# Patient Record
Sex: MALE | Race: WHITE | NOT HISPANIC OR LATINO | Employment: OTHER | URBAN - METROPOLITAN AREA
[De-identification: names, ages, dates, MRNs, and addresses within clinical notes are randomized per-mention and may not be internally consistent; named-entity substitution may affect disease eponyms.]

---

## 2022-01-26 ENCOUNTER — TRANSFERRED RECORDS (OUTPATIENT)
Dept: HEALTH INFORMATION MANAGEMENT | Facility: CLINIC | Age: 67
End: 2022-01-26
Payer: COMMERCIAL

## 2022-02-22 ENCOUNTER — TRANSCRIBE ORDERS (OUTPATIENT)
Dept: OTHER | Age: 67
End: 2022-02-22
Payer: COMMERCIAL

## 2022-02-22 DIAGNOSIS — C44.99 OTHER SPECIFIED MALIGNANT NEOPLASM OF SKIN, UNSPECIFIED: Primary | ICD-10-CM

## 2022-02-25 ENCOUNTER — MEDICAL CORRESPONDENCE (OUTPATIENT)
Dept: HEALTH INFORMATION MANAGEMENT | Facility: CLINIC | Age: 67
End: 2022-02-25
Payer: COMMERCIAL

## 2022-03-03 ENCOUNTER — TRANSCRIBE ORDERS (OUTPATIENT)
Dept: OTHER | Age: 67
End: 2022-03-03
Payer: COMMERCIAL

## 2022-03-03 DIAGNOSIS — D04.9 BASAL CELL CARCINOMA (BCC) IN SITU OF SKIN: Primary | ICD-10-CM

## 2022-03-04 ENCOUNTER — TELEPHONE (OUTPATIENT)
Dept: DERMATOLOGY | Facility: CLINIC | Age: 67
End: 2022-03-04
Payer: COMMERCIAL

## 2022-03-04 NOTE — TELEPHONE ENCOUNTER
[facility] Call Center    Phone Message    May a detailed message be left on voicemail: no     Reason for Call: Appointment Intake    Referring Provider Name:   Dr. Sandhya Escobar  [facility]  Ph: [phone]  Fx: [phone]  [facility] Auth #: [id number]    [facility] Previously seen at [facility].    Diagnosis and/or Symptoms:   BCC, needs MOHS  Other specified malignant neoplasm of skin, unspecified  Basal cell carcinoma (BCC) in situ of skin     Action Taken: Other: [place] [facility]    Travel Screening: Not Applicable       Please call Pt to schedule per referrals.       Oncology Nurse called the patient and they stated they want [place] only with Dr. Arteaga

## 2022-03-04 NOTE — TELEPHONE ENCOUNTER
Left message for pt to call back as we need to know where he had his biopsy done to get the records and he will need to have approval from the [facility] prior to having MOHS procedure. Pt can schedule MOHS but it should be a couple of weeks out to make sure there is time to get [facility] approval.   Callie POWER RN BSN PHN  Specialty Clinics

## 2022-03-07 NOTE — TELEPHONE ENCOUNTER
Spoke to patient who wants a skin check appointment and does not have any concerns at this time.     He will have his records sent to us from the clinic he had an Atypical mole removed.     He will also check with t [facility] to be sure they send us an authorization so he can be seen here as he suspects the current authorization has . I did give him our fax number as well.     Sussy Escobar RN

## 2022-03-07 NOTE — TELEPHONE ENCOUNTER
Patient is currently scheduled for a routine Derm appt 5-17-22.     Per chart review, we cannot access [facility] records in Care everywhere, so if patient needs Mohs surgery, we need records in order to schedule an appointment and if patient wants VA to pay for the visit, need an authorization from the [facility] first and if patient has a current [facility] authorization and that authorization doesn't include Mohs surgery, will need to fill out a request for additional services form to have [facility] authorize Mohs surgery.     Sussy Escobar RN

## 2022-03-07 NOTE — TELEPHONE ENCOUNTER
I don't see that we have any records or VA auth scanned to this patient's chart? Does Derm clinic have paper records?...    I cannot access any records in Care everywhere, either.     See note below- not sure what authorization includes since I cannot see it...    Sussy Escobar RN

## 2022-05-17 ENCOUNTER — OFFICE VISIT (OUTPATIENT)
Dept: DERMATOLOGY | Facility: CLINIC | Age: 67
End: 2022-05-17
Attending: INTERNAL MEDICINE
Payer: COMMERCIAL

## 2022-05-17 VITALS — SYSTOLIC BLOOD PRESSURE: 124 MMHG | DIASTOLIC BLOOD PRESSURE: 85 MMHG | HEART RATE: 67 BPM

## 2022-05-17 DIAGNOSIS — D23.9 DERMAL NEVUS: ICD-10-CM

## 2022-05-17 DIAGNOSIS — C44.99 OTHER SPECIFIED MALIGNANT NEOPLASM OF SKIN, UNSPECIFIED: ICD-10-CM

## 2022-05-17 DIAGNOSIS — L81.4 LENTIGO: Primary | ICD-10-CM

## 2022-05-17 DIAGNOSIS — D18.01 ANGIOMA OF SKIN: ICD-10-CM

## 2022-05-17 DIAGNOSIS — Z85.828 HISTORY OF SKIN CANCER: ICD-10-CM

## 2022-05-17 DIAGNOSIS — L82.1 SEBORRHEIC KERATOSIS: ICD-10-CM

## 2022-05-17 PROCEDURE — 99203 OFFICE O/P NEW LOW 30 MIN: CPT | Performed by: DERMATOLOGY

## 2022-05-17 RX ORDER — ASPIRIN 81 MG/1
81 TABLET, CHEWABLE ORAL
COMMUNITY

## 2022-05-17 RX ORDER — POLYETHYLENE GLYCOL 3350 17 G/17G
1 POWDER, FOR SOLUTION ORAL
COMMUNITY
Start: 2020-12-23

## 2022-05-17 RX ORDER — NITROGLYCERIN 0.4 MG/1
0.4 TABLET SUBLINGUAL
COMMUNITY
Start: 2020-12-03

## 2022-05-17 RX ORDER — ROSUVASTATIN CALCIUM 20 MG/1
10 TABLET, COATED ORAL
COMMUNITY
Start: 2021-01-12

## 2022-05-17 RX ORDER — LISINOPRIL 2.5 MG/1
1 TABLET ORAL DAILY
COMMUNITY
Start: 2020-12-10

## 2022-05-17 RX ORDER — METOPROLOL SUCCINATE 25 MG/1
1 TABLET, EXTENDED RELEASE ORAL DAILY
COMMUNITY
Start: 2020-12-10

## 2022-05-17 RX ORDER — PSYLLIUM HUSK 3.4 G/5.8G
1 POWDER, FOR SUSPENSION ORAL
COMMUNITY

## 2022-05-17 RX ORDER — ACETAMINOPHEN 500 MG
1000 TABLET ORAL
COMMUNITY

## 2022-05-17 RX ORDER — TAMSULOSIN HYDROCHLORIDE 0.4 MG/1
0.8 CAPSULE ORAL
COMMUNITY
Start: 2022-04-22

## 2022-05-17 RX ORDER — HYDROCODONE BITARTRATE AND ACETAMINOPHEN 5; 325 MG/1; MG/1
TABLET ORAL
COMMUNITY
Start: 2020-12-10

## 2022-05-17 RX ORDER — LORATADINE 10 MG/1
10 TABLET ORAL
COMMUNITY

## 2022-05-17 RX ORDER — FOLIC ACID 1 MG/1
1 TABLET ORAL
COMMUNITY

## 2022-05-17 RX ORDER — FLUTICASONE PROPIONATE 50 MCG
SPRAY, SUSPENSION (ML) NASAL
COMMUNITY
Start: 2021-11-14

## 2022-05-17 RX ORDER — GABAPENTIN 400 MG/1
400 CAPSULE ORAL
COMMUNITY
Start: 2020-06-23

## 2022-05-17 RX ORDER — LEVOTHYROXINE SODIUM 175 UG/1
175 TABLET ORAL
COMMUNITY

## 2022-05-17 RX ORDER — PANTOPRAZOLE SODIUM 40 MG/1
40 TABLET, DELAYED RELEASE ORAL
COMMUNITY
Start: 2021-01-12

## 2022-05-17 RX ORDER — CYCLOBENZAPRINE HCL 10 MG
5 TABLET ORAL
COMMUNITY
Start: 2022-03-30

## 2022-05-17 RX ORDER — FUROSEMIDE 20 MG
1 TABLET ORAL DAILY
COMMUNITY
Start: 2021-01-12

## 2022-05-17 RX ORDER — SPIRONOLACTONE 25 MG/1
TABLET ORAL
COMMUNITY
Start: 2020-12-03

## 2022-05-17 NOTE — PROGRESS NOTES
Ulices Rich , a 67 year old year old male patient, I was asked to see by Dr. Escobar for spot on left eyelid.  Patient states this has been present for a while.  Patient reports the following symptoms:  none .  Patient reports the following previous treatments none.  Patient reports the following modifying factors none.  Associated symptoms: none.  Patient has no other skin complaints today.  Remainder of the HPI, Meds, PMH, Allergies, FH, and SH was reviewed in chart.    Pertinent Hx:   Basal cell carcinoma   Past Medical History:   Diagnosis Date     Basal cell carcinoma        History reviewed. No pertinent surgical history.     History reviewed. No pertinent family history.    Social History     Socioeconomic History     Marital status:      Spouse name: Not on file     Number of children: Not on file     Years of education: Not on file     Highest education level: Not on file   Occupational History     Not on file   Tobacco Use     Smoking status: Current Every Day Smoker     Smokeless tobacco: Never Used     Tobacco comment: currently on nicotine gum   Substance and Sexual Activity     Alcohol use: Not on file     Drug use: Not on file     Sexual activity: Not on file   Other Topics Concern     Not on file   Social History Narrative     Not on file     Social Determinants of Health     Financial Resource Strain: Not on file   Food Insecurity: Not on file   Transportation Needs: Not on file   Physical Activity: Not on file   Stress: Not on file   Social Connections: Not on file   Intimate Partner Violence: Not on file   Housing Stability: Not on file       Outpatient Encounter Medications as of 5/17/2022   Medication Sig Dispense Refill     acetaminophen (TYLENOL) 500 MG tablet Take 1,000 mg by mouth       aspirin (ASA) 81 MG chewable tablet Take 81 mg by mouth       cyclobenzaprine (FLEXERIL) 10 MG tablet Take 5 mg by mouth       fluticasone (FLONASE) 50 MCG/ACT nasal spray        folic acid  (FOLVITE) 1 MG tablet Take 1 mg by mouth       furosemide (LASIX) 20 MG tablet Take 1 tablet by mouth daily       gabapentin (NEURONTIN) 400 MG capsule Take 400 mg by mouth       HYDROcodone-acetaminophen (NORCO) 5-325 MG tablet        levothyroxine (SYNTHROID/LEVOTHROID) 175 MCG tablet Take 175 mcg by mouth       lisinopril (ZESTRIL) 2.5 MG tablet Take 1 tablet by mouth daily       loratadine (CLARITIN) 10 MG tablet Take 10 mg by mouth       metoprolol succinate ER (TOPROL XL) 25 MG 24 hr tablet Take 1 tablet by mouth daily       nicotine (NICORETTE) 2 MG gum Take 2 mg by mouth       nitroGLYcerin (NITROSTAT) 0.4 MG sublingual tablet Place 0.4 mg under the tongue       pantoprazole (PROTONIX) 40 MG EC tablet Take 40 mg by mouth       polyethylene glycol (MIRALAX) 17 GM/Dose powder Take 1 packet by mouth       Psyllium (QC NATURAL VEGETABLE) 95 % POWD Take 1 tsp by mouth       rosuvastatin (CRESTOR) 20 MG tablet Take 10 mg by mouth       spironolactone (ALDACTONE) 25 MG tablet        tamsulosin (FLOMAX) 0.4 MG capsule Take 0.8 mg by mouth       vitamin B-12 (CYANOCOBALAMIN) 1000 MCG tablet Take 1,000 mcg by mouth       No facility-administered encounter medications on file as of 5/17/2022.             Review Of Systems  Skin: As above  Eyes: negative  Ears/Nose/Throat: negative  Respiratory: No shortness of breath, dyspnea on exertion, cough, or hemoptysis  Cardiovascular: negative  Gastrointestinal: negative  Genitourinary: negative  Musculoskeletal: negative  Neurologic: negative  Psychiatric: negative  Hematologic/Lymphatic/Immunologic: negative  Endocrine: negative      O:   NAD, WDWN, Alert & Oriented, Mood & Affect wnl, Vitals stable   Here today with wife    /85   Pulse 67    General appearance abigail ii   Vitals stable   Alert, oriented and in no acute distress      Following lymph nodes palpated: Occipital, Cervical, Supraclavicular no lad  L lower lid nodule with comedone   Scar on left neck    Stuck  on papules and brown macules on trunk and ext    Red papules on trunk   Flesh colored papules on trunk      The remainder of the full exam was normal; the following areas were examined:  conjunctiva/lids, oral mucosa, neck, peripheral vascular system, abdomen, lymph nodes, digits/nails, eccrine and apocrine glands, scalp/hair, face, neck, chest, abdomen, buttocks, back, RUE, LUE, RLE, LLE       Eyes: Conjunctivae/lids:Normal     ENT: Lips, buccal mucosa, tongue: normal    MSK:Normal    Cardiovascular: peripheral edema slight    Pulm: Breathing Normal    Lymph Nodes: No Head and Neck Lymphadenopathy     Neuro/Psych: Orientation:Normal; Mood/Affect:Normal      A/P:  1. Seborrheic keratosis, lentigo, angioma, dermal nevus, hx of non-melanoma skin cancer, epidermal cyst  It was a pleasure speaking to Ulices Rich today.  Previous clinic  notes and pertinent laboratory tests were reviewed prior to Ulices Rich's visit.  BENIGN LESIONS DISCUSSED WITH PATIENT:  I discussed the specifics of tumor, prognosis, and genetics of benign lesions.  I explained that treatment of these lesions would be purely cosmetic and not medically neccessary.  I discussed with patient different removal options including excision, cautery and /or laser.      Nature and genetics of benign skin lesions dicussed with patient.  Signs and Symptoms of skin cancer discussed with patient.  Patient encouraged to perform monthly skin exams.  UV precautions reviewed with patient.  Risks of non-melanoma skin cancer discussed with patient   Return to clinic 12 months

## 2023-01-13 ENCOUNTER — TRANSFERRED RECORDS (OUTPATIENT)
Dept: DERMATOLOGY | Facility: CLINIC | Age: 68
End: 2023-01-13